# Patient Record
Sex: MALE | Race: WHITE
[De-identification: names, ages, dates, MRNs, and addresses within clinical notes are randomized per-mention and may not be internally consistent; named-entity substitution may affect disease eponyms.]

---

## 2019-04-24 ENCOUNTER — HOSPITAL ENCOUNTER (EMERGENCY)
Dept: HOSPITAL 80 - FED | Age: 29
Discharge: HOME | End: 2019-04-24
Payer: SELF-PAY

## 2019-04-24 VITALS — DIASTOLIC BLOOD PRESSURE: 91 MMHG | SYSTOLIC BLOOD PRESSURE: 113 MMHG
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
## 2019-04-24 DIAGNOSIS — S43.004A: Primary | ICD-10-CM
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
## 2019-04-24 PROCEDURE — 0RSJXZZ REPOSITION RIGHT SHOULDER JOINT, EXTERNAL APPROACH: ICD-10-PCS
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
